# Patient Record
Sex: MALE | Race: WHITE | NOT HISPANIC OR LATINO | URBAN - METROPOLITAN AREA
[De-identification: names, ages, dates, MRNs, and addresses within clinical notes are randomized per-mention and may not be internally consistent; named-entity substitution may affect disease eponyms.]

---

## 2019-09-18 ENCOUNTER — EMERGENCY (EMERGENCY)
Facility: HOSPITAL | Age: 54
LOS: 1 days | Discharge: HOME | End: 2019-09-18
Attending: EMERGENCY MEDICINE | Admitting: EMERGENCY MEDICINE
Payer: COMMERCIAL

## 2019-09-18 VITALS
SYSTOLIC BLOOD PRESSURE: 128 MMHG | OXYGEN SATURATION: 100 % | HEART RATE: 84 BPM | RESPIRATION RATE: 14 BRPM | DIASTOLIC BLOOD PRESSURE: 60 MMHG

## 2019-09-18 VITALS
RESPIRATION RATE: 18 BRPM | SYSTOLIC BLOOD PRESSURE: 118 MMHG | HEART RATE: 77 BPM | HEIGHT: 75 IN | OXYGEN SATURATION: 100 % | DIASTOLIC BLOOD PRESSURE: 78 MMHG | TEMPERATURE: 98 F | WEIGHT: 248.9 LBS

## 2019-09-18 LAB
ALBUMIN SERPL ELPH-MCNC: 3.6 G/DL — SIGNIFICANT CHANGE UP (ref 3.3–5)
ALP SERPL-CCNC: 45 U/L — SIGNIFICANT CHANGE UP (ref 40–120)
ALT FLD-CCNC: 25 U/L — SIGNIFICANT CHANGE UP (ref 12–78)
ANION GAP SERPL CALC-SCNC: 6 MMOL/L — SIGNIFICANT CHANGE UP (ref 5–17)
AST SERPL-CCNC: 38 U/L — HIGH (ref 15–37)
BILIRUB SERPL-MCNC: 0.6 MG/DL — SIGNIFICANT CHANGE UP (ref 0.2–1.2)
BUN SERPL-MCNC: 20 MG/DL — SIGNIFICANT CHANGE UP (ref 7–23)
CALCIUM SERPL-MCNC: 8.6 MG/DL — SIGNIFICANT CHANGE UP (ref 8.5–10.1)
CHLORIDE SERPL-SCNC: 107 MMOL/L — SIGNIFICANT CHANGE UP (ref 96–108)
CK SERPL-CCNC: 150 U/L — SIGNIFICANT CHANGE UP (ref 26–308)
CK SERPL-CCNC: 54 U/L — SIGNIFICANT CHANGE UP (ref 26–308)
CO2 SERPL-SCNC: 28 MMOL/L — SIGNIFICANT CHANGE UP (ref 22–31)
CREAT SERPL-MCNC: 1.2 MG/DL — SIGNIFICANT CHANGE UP (ref 0.5–1.3)
D DIMER BLD IA.RAPID-MCNC: 196 NG/ML DDU — SIGNIFICANT CHANGE UP
GLUCOSE SERPL-MCNC: 97 MG/DL — SIGNIFICANT CHANGE UP (ref 70–99)
HCT VFR BLD CALC: 46.7 % — SIGNIFICANT CHANGE UP (ref 39–50)
HGB BLD-MCNC: 15.7 G/DL — SIGNIFICANT CHANGE UP (ref 13–17)
MCHC RBC-ENTMCNC: 30 PG — SIGNIFICANT CHANGE UP (ref 27–34)
MCHC RBC-ENTMCNC: 33.6 GM/DL — SIGNIFICANT CHANGE UP (ref 32–36)
MCV RBC AUTO: 89.3 FL — SIGNIFICANT CHANGE UP (ref 80–100)
NRBC # BLD: 0 /100 WBCS — SIGNIFICANT CHANGE UP (ref 0–0)
PLATELET # BLD AUTO: 189 K/UL — SIGNIFICANT CHANGE UP (ref 150–400)
POTASSIUM SERPL-MCNC: 5.4 MMOL/L — HIGH (ref 3.5–5.3)
POTASSIUM SERPL-SCNC: 5.4 MMOL/L — HIGH (ref 3.5–5.3)
PROT SERPL-MCNC: 7.6 G/DL — SIGNIFICANT CHANGE UP (ref 6–8.3)
RBC # BLD: 5.23 M/UL — SIGNIFICANT CHANGE UP (ref 4.2–5.8)
RBC # FLD: 12.6 % — SIGNIFICANT CHANGE UP (ref 10.3–14.5)
SODIUM SERPL-SCNC: 141 MMOL/L — SIGNIFICANT CHANGE UP (ref 135–145)
TROPONIN I SERPL-MCNC: <.015 NG/ML — SIGNIFICANT CHANGE UP (ref 0.01–0.04)
TROPONIN I SERPL-MCNC: <.015 NG/ML — SIGNIFICANT CHANGE UP (ref 0.01–0.04)
WBC # BLD: 6.44 K/UL — SIGNIFICANT CHANGE UP (ref 3.8–10.5)
WBC # FLD AUTO: 6.44 K/UL — SIGNIFICANT CHANGE UP (ref 3.8–10.5)

## 2019-09-18 PROCEDURE — 36415 COLL VENOUS BLD VENIPUNCTURE: CPT

## 2019-09-18 PROCEDURE — 96374 THER/PROPH/DIAG INJ IV PUSH: CPT

## 2019-09-18 PROCEDURE — 71046 X-RAY EXAM CHEST 2 VIEWS: CPT

## 2019-09-18 PROCEDURE — 85027 COMPLETE CBC AUTOMATED: CPT

## 2019-09-18 PROCEDURE — 99284 EMERGENCY DEPT VISIT MOD MDM: CPT | Mod: 25

## 2019-09-18 PROCEDURE — 93010 ELECTROCARDIOGRAM REPORT: CPT

## 2019-09-18 PROCEDURE — 93005 ELECTROCARDIOGRAM TRACING: CPT

## 2019-09-18 PROCEDURE — 84484 ASSAY OF TROPONIN QUANT: CPT

## 2019-09-18 PROCEDURE — 99285 EMERGENCY DEPT VISIT HI MDM: CPT

## 2019-09-18 PROCEDURE — 99284 EMERGENCY DEPT VISIT MOD MDM: CPT

## 2019-09-18 PROCEDURE — 82550 ASSAY OF CK (CPK): CPT

## 2019-09-18 PROCEDURE — 85379 FIBRIN DEGRADATION QUANT: CPT

## 2019-09-18 PROCEDURE — 80053 COMPREHEN METABOLIC PANEL: CPT

## 2019-09-18 PROCEDURE — 71046 X-RAY EXAM CHEST 2 VIEWS: CPT | Mod: 26

## 2019-09-18 RX ORDER — METOPROLOL TARTRATE 50 MG
1 TABLET ORAL
Qty: 28 | Refills: 0
Start: 2019-09-18 | End: 2019-10-01

## 2019-09-18 RX ORDER — AMLODIPINE AND VALSARTAN 5; 320 MG/1; MG/1
1 TABLET, FILM COATED ORAL
Qty: 0 | Refills: 0 | DISCHARGE

## 2019-09-18 RX ORDER — RIVAROXABAN 15 MG-20MG
20 KIT ORAL ONCE
Refills: 0 | Status: COMPLETED | OUTPATIENT
Start: 2019-09-18 | End: 2019-09-18

## 2019-09-18 RX ORDER — DILTIAZEM HCL 120 MG
10 CAPSULE, EXT RELEASE 24 HR ORAL ONCE
Refills: 0 | Status: COMPLETED | OUTPATIENT
Start: 2019-09-18 | End: 2019-09-18

## 2019-09-18 RX ORDER — RIVAROXABAN 15 MG-20MG
1 KIT ORAL
Qty: 30 | Refills: 0
Start: 2019-09-18 | End: 2019-10-17

## 2019-09-18 RX ORDER — ASPIRIN/CALCIUM CARB/MAGNESIUM 324 MG
324 TABLET ORAL ONCE
Refills: 0 | Status: COMPLETED | OUTPATIENT
Start: 2019-09-18 | End: 2019-09-18

## 2019-09-18 RX ADMIN — RIVAROXABAN 20 MILLIGRAM(S): KIT at 12:00

## 2019-09-18 RX ADMIN — Medication 10 MILLIGRAM(S): at 10:30

## 2019-09-18 RX ADMIN — Medication 324 MILLIGRAM(S): at 09:43

## 2019-09-18 NOTE — ED PROVIDER NOTE - OBJECTIVE STATEMENT
54 yo M p/w 2 episodes today of palpitations , diaphoresis. 1st episode lasted ~ 5 min, last episode lasted ~ 1 min. no chest pain. No abd pain. no n/v/d/. No recent travel / immob. no weakness / dizziness. no numb/ting/focal weak. no agg/allev factors. no other inj or co.  EMS reports new onset afib at scene.

## 2019-09-18 NOTE — CONSULT NOTE ADULT - ASSESSMENT
54 yo M w PMHx of hypertension presents to ED for chest palpitations. Patient states yesterday he felt his heart racing, chest "tightness" and became diaphoretic. This first episode lasted about 5 minutes. This morning while at work he had another similar episode that lasted 1 minute. He was bib ems who reported he was on rate controlled a fib. Of note, patient follows up with cardiologist Dr. Willams 16 days ago and was started on amlodipine 5 mg for high BP. Patient also had an echo which he does not know the results. Also states he was not able to get a stress test this year because of his high blood pressure and was told to f/u in 6 weeks. States last stress test, last year, was negative.   Admits of family history (brother) with a fib s/p ablation. Denies ABHAY, N.V.D.C. Cardiac monitor shows a fib with non sustained tachycardia in the 110s    - No clear evidence of acute ischemia, trops negative x 1. Will follow CE q 4.    - EKG shows a fib with some  T wave inversions in inferior leads.    - No meaningful evidence of volume overload.  - Previous TTE results available.   - BP well controlled, monitor routine hemodynamics.  - Continue would consider switching amlodipine for metoprolol   - Monitor and replete lytes, keep K>4, Mg>2.  - Other cardiovascular workup will depend on clinical course.  - All other workup per primary team.  - Will continue to follow. 52 yo M w PMHx of hypertension presents to ED for chest palpitations. Patient states yesterday he felt his heart racing, chest "tightness" and became diaphoretic. This first episode lasted about 5 minutes. This morning while at work he had another similar episode that lasted 1 minute. He was bib ems who reported he was on rate controlled a fib. Of note, patient follows up with cardiologist Dr. Willams 16 days ago and was started on amlodipine 5 mg for high BP. Patient also had an echo which he does not know the results. Also states he was not able to get a stress test this year because of his high blood pressure and was told to f/u in 6 weeks. States last stress test, last year, was negative.   Admits of family history (brother) with a fib s/p ablation. Denies ABHAY, N.V.D.C. Cardiac monitor shows a fib with non sustained tachycardia in the 110s.     - No clear evidence of acute ischemia, trops negative x 1. Will follow one more set of CE.     - EKG shows a fib with some  T wave inversions in inferior leads, likely secondary to chronic HTN.   - No meaningful evidence of volume overload.  - Previous TTE results available.   - BP well controlled, monitor routine hemodynamics.  - Change amlodipine for Toprol 50mg BID  - Begin Xarelto 20 mg daily  - Although patient states that palpitations began yesterday, it is difficult to know for sure if he was in a fib for more than 12 hours. Patient also ate this morning which discards the possibility of cardioversion. For now, continue Toprol 50 mg BID instead of amlodipine and Xarelto 20 mg daily.    - Outpatient follow up for possible transesophageal echo cardioversion    - Monitor and replete lytes, keep K>4, Mg>2.  - Other cardiovascular workup will depend on clinical course.  - All other workup per primary team.  - Will continue to follow.

## 2019-09-18 NOTE — ED ADULT NURSE NOTE - OBJECTIVE STATEMENT
Received the patient in the Er. Patient is alert and oriented. Skin warm and dry. C/O palpitations on and off. Denies any chest pain.

## 2019-09-18 NOTE — ED PROVIDER NOTE - NSFOLLOWUPINSTRUCTIONS_ED_ALL_ED_FT
1)  Follow-up with your Primary Medical Doctor. Call today / next business day for prompt follow-up.  2) Follow-up with your Cardiologist as discussed. Call today / next business day for prompt follow-up and further workup and evaluation.  3) Return to Emergency room for any worsening or persistent pain, shortness of breath, weakness, fever, abdominal pain, dizziness, passing out, unexplained pain in arms, legs, back, any vomiting, feeling like your heart is racing,  or any other concerning symptoms.  4) See attached instruction sheets for additional information, including information regarding signs and symptoms to look out for, reasons to seek immediate care and other important instructions.   5) Toprol 50mg twice daily  6) Xarelto as prescribed (you will need to have your doctor extend your prescription)

## 2019-09-18 NOTE — ED PROVIDER NOTE - CHPI ED SYMPTOMS NEG
no chills/no shortness of breath/no vomiting/no dizziness/no syncope/no nausea/no back pain/no fever/no cough/no diaphoresis

## 2019-09-18 NOTE — ED ADULT NURSE NOTE - ED STAT RN HANDOFF DETAILS
Patient got discharge by Dr. Choudhary. Explained all discharge instructions. Patient signed the discharge sheet.

## 2019-09-18 NOTE — ED PROVIDER NOTE - PROGRESS NOTE DETAILS
Pt seen by Dr ESTRELLITA Allen, check 4 hr CE, outpt fu with his own cardio in [facility]. Start Xarelto, Toprol 50 BID Dw Pt re nature of his sx, need for close, prompt fu with PMD and his own cardio in [facility]. No other acute co.

## 2019-09-18 NOTE — ED PROVIDER NOTE - PATIENT PORTAL LINK FT
You can access the FollowMyHealth Patient Portal offered by [facility] by registering at the following website: [email or web address]. By joining [facility]’s FollowMyHealth portal, you will also be able to view your health information using other applications (apps) compatible with our system.

## 2019-09-18 NOTE — CONSULT NOTE ADULT - SUBJECTIVE AND OBJECTIVE BOX
[facility]         Dolly Biggs, Tiffany, Cammy, Onofre, Elie, Ray        [phone] (office)    CHIEF COMPLAINT: Patient is a 53y old  Male who presents with a chief complaint of     HPI:  54 yo M w PMHx of hypertension presents to ED for chest palpitations. Patient states yesterday he felt his heart racing, chest "tightness" and became diaphoretic. This first episode lasted about 5 minutes. This morning while at work he had another similar episode that lasted 1 minute. He was bib ems who reported he was on rate controlled a fib. Of note, patient follows up with cardiologist Dr. Willams 16 days ago and was started on amlodipine 5 mg for high BP. Patient also had an echo which he does not know the results. Also states he was not able to get a stress test this year because of his high blood pressure and was told to f/u in 6 weeks. States last stress test, last year, was negative.   Admits of family history (brother) with a fib s/p ablation. Denies SOB, N.V.D.C       PAST MEDICAL & SURGICAL HISTORY:  Hypertension, unspecified type      SOCIAL HISTORY: No active tobacco, alcohol or illicit drug use.    FAMILY HISTORY:      Home Medications:  amlodipine-valsartan 5 mg-160 mg oral tablet: 1 tab(s) orally once a day (18 Sep 2019 09:19)      MEDICATIONS  (STANDING):    MEDICATIONS  (PRN):      Allergies    No Known Allergies    Intolerances        REVIEW OF SYSTEMS: Is negative for eye, ENT, GI, , allergic, dermatologic, musculoskeletal and neurologic, except as described above.    VITAL SIGNS:   Vital Signs Last 24 Hrs  T(C): 36.4 (18 Sep 2019 08:50), Max: 36.4 (18 Sep 2019 08:50)  T(F): 97.6 (18 Sep 2019 08:50), Max: 97.6 (18 Sep 2019 08:50)  HR: 77 (18 Sep 2019 08:50) (77 - 77)  BP: 118/78 (18 Sep 2019 08:50) (118/78 - 118/78)  BP(mean): --  RR: 18 (18 Sep 2019 08:50) (18 - 18)  SpO2: 100% (18 Sep 2019 08:50) (100% - 100%)    I&O's Summary      PHYSICAL EXAM:  Constitutional: NAD, awake and alert, well-developed  Eyes: EOMI, no oral cyanosis, conjunctivae clear, anicteric  Pulmonary: Non-labored, breath sounds are clear bilaterally, no wheezing, rales or rhonchi  Cardiovascular: Irregular S1 and S2, in af, normal rate. No murmur  Gastrointestinal: Bowel sounds present, soft, nontender  Lymph: No peripheral edema   Neurological: Alert, strength and sensitivity are grossly intact  Skin: Warm, well-perfused  Psych: Mood and affect appropriate    LABS: All Labs Reviewed:                        15.7   6.44  )-----------( 189      ( 18 Sep 2019 09:26 )             46.7     18 Sep 2019 09:26    141    |  107    |  20     ----------------------------<  97     5.4     |  28     |  1.20     Ca    8.6        18 Sep 2019 09:26    TPro  7.6    /  Alb  3.6    /  TBili  0.6    /  DBili  x      /  AST  38     /  ALT  25     /  AlkPhos  45     18 Sep 2019 09:26      CARDIAC MARKERS ( 18 Sep 2019 09:26 )  <.015 ng/mL / x     / x     / x     / x          Blood Culture:         RADIOLOGY:    EKG: